# Patient Record
Sex: FEMALE | Race: WHITE | NOT HISPANIC OR LATINO | URBAN - METROPOLITAN AREA
[De-identification: names, ages, dates, MRNs, and addresses within clinical notes are randomized per-mention and may not be internally consistent; named-entity substitution may affect disease eponyms.]

---

## 2017-01-05 PROBLEM — R06.02 SOB (SHORTNESS OF BREATH): Status: ACTIVE | Noted: 2017-01-05

## 2017-01-05 PROBLEM — I48.0 PAROXYSMAL ATRIAL FIBRILLATION: Status: ACTIVE | Noted: 2017-01-05

## 2017-02-01 PROBLEM — E05.90 HYPERTHYROIDISM: Status: ACTIVE | Noted: 2017-02-01

## 2017-08-03 PROBLEM — R42 DIZZINESS: Status: ACTIVE | Noted: 2017-08-03

## 2017-11-27 ENCOUNTER — OFFICE VISIT (OUTPATIENT)
Dept: URGENT CARE | Facility: CLINIC | Age: 77
End: 2017-11-27
Payer: MEDICARE

## 2017-11-27 VITALS
DIASTOLIC BLOOD PRESSURE: 96 MMHG | BODY MASS INDEX: 23.7 KG/M2 | HEART RATE: 74 BPM | OXYGEN SATURATION: 100 % | WEIGHT: 160 LBS | TEMPERATURE: 97 F | HEIGHT: 69 IN | RESPIRATION RATE: 18 BRPM | SYSTOLIC BLOOD PRESSURE: 218 MMHG

## 2017-11-27 DIAGNOSIS — S70.01XA CONTUSION OF RIGHT HIP, INITIAL ENCOUNTER: Primary | ICD-10-CM

## 2017-11-27 PROCEDURE — 99203 OFFICE O/P NEW LOW 30 MIN: CPT | Mod: 25,S$GLB,, | Performed by: EMERGENCY MEDICINE

## 2017-11-27 PROCEDURE — 96372 THER/PROPH/DIAG INJ SC/IM: CPT | Mod: S$GLB,,, | Performed by: EMERGENCY MEDICINE

## 2017-11-27 RX ORDER — KETOROLAC TROMETHAMINE 30 MG/ML
30 INJECTION, SOLUTION INTRAMUSCULAR; INTRAVENOUS
Status: COMPLETED | OUTPATIENT
Start: 2017-11-27 | End: 2017-11-27

## 2017-11-27 RX ORDER — TRAMADOL HYDROCHLORIDE 50 MG/1
50 TABLET ORAL EVERY 6 HOURS PRN
Qty: 20 TABLET | Refills: 0 | Status: SHIPPED | OUTPATIENT
Start: 2017-11-27 | End: 2018-11-27

## 2017-11-27 RX ADMIN — KETOROLAC TROMETHAMINE 30 MG: 30 INJECTION, SOLUTION INTRAMUSCULAR; INTRAVENOUS at 08:11

## 2017-11-28 NOTE — PROGRESS NOTES
"Subjective:       Patient ID: Marguerite Paul is a 77 y.o. female.    Vitals:  height is 5' 9" (1.753 m) and weight is 72.6 kg (160 lb). Her temperature is 97.3 °F (36.3 °C). Her blood pressure is 218/96 (abnormal) and her pulse is 74. Her respiration is 18 and oxygen saturation is 100%.     Chief Complaint: Hip Pain (Happened a couple of hours ago, Hit right hip on chair)    Hip Pain    The incident occurred 3 to 6 hours ago. The incident occurred at home. The pain is present in the right hip. The quality of the pain is described as aching. The pain has been constant since onset. Pertinent negatives include no numbness. She reports no foreign bodies present. The symptoms are aggravated by movement. The treatment provided no relief.     Review of Systems   Constitution: Negative for weakness and malaise/fatigue.   HENT: Negative for nosebleeds.    Cardiovascular: Negative for chest pain and syncope.   Respiratory: Negative for shortness of breath.    Musculoskeletal: Positive for joint pain. Negative for back pain and neck pain.   Gastrointestinal: Negative for abdominal pain.   Genitourinary: Negative for hematuria.   Neurological: Negative for dizziness and numbness.       Objective:      Physical Exam   Constitutional: She is oriented to person, place, and time. She appears well-developed and well-nourished.   HENT:   Head: Normocephalic and atraumatic.   Eyes: EOM are normal. Pupils are equal, round, and reactive to light.   Neck: Normal range of motion.   Cardiovascular: Normal rate, regular rhythm and normal heart sounds.    Pulmonary/Chest: Effort normal and breath sounds normal.   Abdominal: Soft.   Musculoskeletal: Normal range of motion. She exhibits tenderness. She exhibits no edema or deformity.   ttp over the lateral upper hip. Normal gait. No pain on axial load, normal rom of the hip in all rom's. Minimal pain on palpation of the greater tronchanteric region but moreso at thehposterior superior iliac " wing. Distally nv intact   Neurological: She is alert and oriented to person, place, and time.   Skin: No rash noted. No pallor.   No bruising, no edema, no abrasion   Nursing note and vitals reviewed.      Xray: no fracture, no dislocation, no bony abnormality    Assessment:       1. Contusion of right hip, initial encounter        Plan:         Contusion of right hip, initial encounter  -     Cancel: X-Ray Hip 2 View Right; Future; Expected date: 11/27/2017    Other orders  -     traMADol (ULTRAM) 50 mg tablet; Take 1 tablet (50 mg total) by mouth every 6 (six) hours as needed for Pain.  Dispense: 20 tablet; Refill: 0  -     ketorolac injection 30 mg; Inject 30 mg into the muscle one time.      Patient Instructions   SEE HIP CONTUSION SHEET/INFO  30 MG TORADOL GIVEN IN CLINIC  TRAMADOL RX GIVEN  REST AND ICE SORE AREA  FOLLOW UP WITH PCP/ORTHOPEDIST IF NOT MUCH IMPROVED IN 1-2 WEEKS  XRAY NEGATIVE  BE SURE TO FOLLOW UP WITH PRIMARY CARE MD FOR LONG-TERM MANAGEMENT OF BLOOD PRESSURE      Hip Contusion    A contusion is another word for a bruise. It happens when small blood vessels break open and leak blood into the nearby area. A hip contusion can result from a bump, hit, or fall. Symptoms of a contusion often include changes in skin color (bruising), swelling, and pain. It may take several hours for a deep bruise to show up. If the injury is severe, you may need an X-ray to check for broken bones. Swelling should decrease in a few days. Bruising and pain may take several weeks to go away.  Home care  · Unless another medicine was prescribed, you may take acetaminophen, ibuprofen, or naproxen to help relieve pain and swelling. If needed, stronger pain medicines may be prescribed. Take all medicines exactly as directed.  · Ice the bruised area to help reduce pain and swelling. Wrap a cold source (ice pack or ice cubes in a plastic bag) in a thin towel. Apply the cold source to the bruised area for 20 minutes every 1  to 2 hours the first day. Continue this 3 to 4 times a day until the pain and swelling goes away.  · If walking causes pain, use crutches or a walker until you can walk without pain. These items can be rented at most pharmacies and orthopedic supply stores.  · If your injury is keeping you from moving around or caring for yourself properly, you may qualify for services such as home healthcare. Check with your doctor and insurance company to see if this type of care is covered.  Follow-up  Follow up with your healthcare provider, or as advised.  When to seek medical advice   Call your healthcare provider right away if any of these occur:  · Increased pain, bruising, or swelling near the injured area  · Decreased ability to bear weight on the injured side  · Pain or swelling develops below the knee  · Chest pain or shortness of breath  Date Last Reviewed: 4/1/2017  © 5973-6104 [employer] [address] [place] [address], [place] [address]. All rights reserved. This information is not intended as a substitute for professional medical care. Always follow your healthcare professional's instructions.

## 2017-11-28 NOTE — PATIENT INSTRUCTIONS
SEE HIP CONTUSION SHEET/INFO  30 MG TORADOL GIVEN IN CLINIC  TRAMADOL RX GIVEN  REST AND ICE SORE AREA  FOLLOW UP WITH PCP/ORTHOPEDIST IF NOT MUCH IMPROVED IN 1-2 WEEKS  XRAY NEGATIVE  BE SURE TO FOLLOW UP WITH PRIMARY CARE MD FOR LONG-TERM MANAGEMENT OF BLOOD PRESSURE      Hip Contusion    A contusion is another word for a bruise. It happens when small blood vessels break open and leak blood into the nearby area. A hip contusion can result from a bump, hit, or fall. Symptoms of a contusion often include changes in skin color (bruising), swelling, and pain. It may take several hours for a deep bruise to show up. If the injury is severe, you may need an X-ray to check for broken bones. Swelling should decrease in a few days. Bruising and pain may take several weeks to go away.  Home care  · Unless another medicine was prescribed, you may take acetaminophen, ibuprofen, or naproxen to help relieve pain and swelling. If needed, stronger pain medicines may be prescribed. Take all medicines exactly as directed.  · Ice the bruised area to help reduce pain and swelling. Wrap a cold source (ice pack or ice cubes in a plastic bag) in a thin towel. Apply the cold source to the bruised area for 20 minutes every 1 to 2 hours the first day. Continue this 3 to 4 times a day until the pain and swelling goes away.  · If walking causes pain, use crutches or a walker until you can walk without pain. These items can be rented at most pharmacies and orthopedic supply stores.  · If your injury is keeping you from moving around or caring for yourself properly, you may qualify for services such as home healthcare. Check with your doctor and insurance company to see if this type of care is covered.  Follow-up  Follow up with your healthcare provider, or as advised.  When to seek medical advice   Call your healthcare provider right away if any of these occur:  · Increased pain, bruising, or swelling near the injured area  · Decreased ability  to bear weight on the injured side  · Pain or swelling develops below the knee  · Chest pain or shortness of breath  Date Last Reviewed: 4/1/2017 © 2000-2017 [employer]. [address] [place] [address], [place] [address]. All rights reserved. This information is not intended as a substitute for professional medical care. Always follow your healthcare professional's instructions.

## 2021-01-23 ENCOUNTER — OFFICE VISIT (OUTPATIENT)
Dept: URGENT CARE | Facility: CLINIC | Age: 81
End: 2021-01-23
Payer: MEDICARE

## 2021-01-23 VITALS
SYSTOLIC BLOOD PRESSURE: 160 MMHG | WEIGHT: 160.06 LBS | BODY MASS INDEX: 23.71 KG/M2 | RESPIRATION RATE: 20 BRPM | HEART RATE: 82 BPM | HEIGHT: 69 IN | DIASTOLIC BLOOD PRESSURE: 77 MMHG | OXYGEN SATURATION: 98 % | TEMPERATURE: 98 F

## 2021-01-23 DIAGNOSIS — R52 BODY ACHES: Primary | ICD-10-CM

## 2021-01-23 DIAGNOSIS — J06.9 ACUTE URI: ICD-10-CM

## 2021-01-23 LAB
CTP QC/QA: YES
SARS-COV-2 RDRP RESP QL NAA+PROBE: NEGATIVE

## 2021-01-23 PROCEDURE — U0002: ICD-10-PCS | Mod: QW,CR,S$GLB, | Performed by: FAMILY MEDICINE

## 2021-01-23 PROCEDURE — U0002 COVID-19 LAB TEST NON-CDC: HCPCS | Mod: QW,CR,S$GLB, | Performed by: FAMILY MEDICINE

## 2021-01-23 PROCEDURE — 99214 PR OFFICE/OUTPT VISIT, EST, LEVL IV, 30-39 MIN: ICD-10-PCS | Mod: S$GLB,CS,, | Performed by: FAMILY MEDICINE

## 2021-01-23 PROCEDURE — 99214 OFFICE O/P EST MOD 30 MIN: CPT | Mod: S$GLB,CS,, | Performed by: FAMILY MEDICINE

## 2021-01-23 RX ORDER — IRBESARTAN 150 MG/1
TABLET ORAL
COMMUNITY
Start: 2021-01-19

## 2021-01-25 ENCOUNTER — TELEPHONE (OUTPATIENT)
Dept: URGENT CARE | Facility: CLINIC | Age: 81
End: 2021-01-25

## 2022-03-10 ENCOUNTER — TELEPHONE (OUTPATIENT)
Dept: DERMATOLOGY | Facility: CLINIC | Age: 82
End: 2022-03-10
Payer: MEDICARE

## 2022-03-10 NOTE — TELEPHONE ENCOUNTER
----- Message from Nell Maynard sent at 3/10/2022 11:56 AM CST -----  Regarding: speak with nurse  Contact: patient  [phone]   please call patient being referred over for light treatment by doctor waiting on a call back please call after lunch per patient thanks.

## 2022-03-10 NOTE — TELEPHONE ENCOUNTER
Left message on recorder to make an appt with one of our dermatologist to get light ordered. Call with any questions.